# Patient Record
Sex: FEMALE
[De-identification: names, ages, dates, MRNs, and addresses within clinical notes are randomized per-mention and may not be internally consistent; named-entity substitution may affect disease eponyms.]

---

## 2018-03-02 ENCOUNTER — HOSPITAL ENCOUNTER (OUTPATIENT)
Dept: HOSPITAL 92 - CT | Age: 33
Discharge: HOME | End: 2018-03-02
Attending: FAMILY MEDICINE
Payer: COMMERCIAL

## 2018-03-02 DIAGNOSIS — N20.0: Primary | ICD-10-CM

## 2018-03-02 PROCEDURE — 74176 CT ABD & PELVIS W/O CONTRAST: CPT

## 2018-03-02 NOTE — CT
NONCONTRAST CT ABDOMEN AND PELVIS:

 

COMPARISON: 

2/16/18.

 

CLINICAL HISTORY: 

Nephrolithiasis, followup.

 

FINDINGS: 

Imaged lung bases are clear.  Redemonstration of punctate, nonobstructing left superior pole renal ca
lculus.  No right side nephrolithiasis, or evidence of obstructive uropathy.  There is retained fecal
 material throughout the colon.  There is a focal hypodensity of the left adnexa, grossly stable by n
oncontrast technique.  The solid abdominal viscera, bowel, lymph nodes, and vascular are limited in a
ssessment on the basis of noncontrast imaging.  No acute osseous pathology visualized.  There are sta
ble-appearing calcifications within the pelvis which indicate phleboliths.  

 

IMPRESSION: 

1.  Redemonstration of nonobstructing left nephrolithiasis.

 

2.  No significant interval detrimental change evident.

 

3.  Grossly stable hypodensity of the left adnexa which may be on the basis of physiologic cyst.  If 
there is need for further evaluation, followup pelvic ultrasound may be performed as necessary.

 

POS: [facility]

## 2018-04-09 ENCOUNTER — HOSPITAL ENCOUNTER (EMERGENCY)
Dept: HOSPITAL 92 - SCSER | Age: 33
Discharge: HOME | End: 2018-04-09
Payer: COMMERCIAL

## 2018-04-09 DIAGNOSIS — K52.9: Primary | ICD-10-CM

## 2018-04-09 DIAGNOSIS — Z87.442: ICD-10-CM

## 2018-04-09 DIAGNOSIS — Z87.891: ICD-10-CM

## 2018-04-09 LAB
ALBUMIN SERPL BCG-MCNC: 4.1 G/DL (ref 3.5–5)
ALP SERPL-CCNC: 85 U/L (ref 40–150)
ALT SERPL W P-5'-P-CCNC: 23 U/L (ref 8–55)
ANION GAP SERPL CALC-SCNC: 15 MMOL/L (ref 10–20)
AST SERPL-CCNC: 17 U/L (ref 5–34)
BASOPHILS # BLD AUTO: 0.1 THOU/UL (ref 0–0.2)
BASOPHILS NFR BLD AUTO: 0.8 % (ref 0–1)
BILIRUB SERPL-MCNC: 0.6 MG/DL (ref 0.2–1.2)
BUN SERPL-MCNC: 9 MG/DL (ref 7–18.7)
CALCIUM SERPL-MCNC: 8.9 MG/DL (ref 7.8–10.44)
CHLORIDE SERPL-SCNC: 106 MMOL/L (ref 98–107)
CO2 SERPL-SCNC: 23 MMOL/L (ref 22–29)
CREAT CL PREDICTED SERPL C-G-VRATE: 0 ML/MIN (ref 70–130)
EOSINOPHIL # BLD AUTO: 0 THOU/UL (ref 0–0.7)
EOSINOPHIL NFR BLD AUTO: 0.2 % (ref 0–10)
GLOBULIN SER CALC-MCNC: 3.1 G/DL (ref 2.4–3.5)
GLUCOSE SERPL-MCNC: 128 MG/DL (ref 70–105)
HGB BLD-MCNC: 14.1 G/DL (ref 12–16)
LYMPHOCYTES # BLD: 2.3 THOU/UL (ref 1.2–3.4)
LYMPHOCYTES NFR BLD AUTO: 22.4 % (ref 21–51)
MCH RBC QN AUTO: 29.6 PG (ref 27–31)
MCV RBC AUTO: 89.7 FL (ref 81–99)
MONOCYTES # BLD AUTO: 0.5 THOU/UL (ref 0.11–0.59)
MONOCYTES NFR BLD AUTO: 5.3 % (ref 0–10)
NEUTROPHILS # BLD AUTO: 7.2 THOU/UL (ref 1.4–6.5)
NEUTROPHILS NFR BLD AUTO: 71.4 % (ref 42–75)
PLATELET # BLD AUTO: 261 THOU/UL (ref 130–400)
POTASSIUM SERPL-SCNC: 3.8 MMOL/L (ref 3.5–5.1)
PREGS CONTROL BACKGROUND?: (no result)
PREGS CONTROL BAR APPEAR?: YES
RBC # BLD AUTO: 4.77 MILL/UL (ref 4.2–5.4)
SODIUM SERPL-SCNC: 140 MMOL/L (ref 136–145)
WBC # BLD AUTO: 10 THOU/UL (ref 4.8–10.8)

## 2018-04-09 PROCEDURE — 83605 ASSAY OF LACTIC ACID: CPT

## 2018-04-09 PROCEDURE — 85025 COMPLETE CBC W/AUTO DIFF WBC: CPT

## 2018-04-09 PROCEDURE — 96374 THER/PROPH/DIAG INJ IV PUSH: CPT

## 2018-04-09 PROCEDURE — 84703 CHORIONIC GONADOTROPIN ASSAY: CPT

## 2018-04-09 PROCEDURE — 83690 ASSAY OF LIPASE: CPT

## 2018-04-09 PROCEDURE — 96361 HYDRATE IV INFUSION ADD-ON: CPT

## 2018-04-09 PROCEDURE — 80053 COMPREHEN METABOLIC PANEL: CPT

## 2018-08-29 ENCOUNTER — HOSPITAL ENCOUNTER (OUTPATIENT)
Dept: HOSPITAL 92 - SDC | Age: 33
End: 2018-08-29
Attending: UROLOGY
Payer: COMMERCIAL

## 2018-08-29 VITALS — BODY MASS INDEX: 31.8 KG/M2

## 2018-08-29 DIAGNOSIS — N20.2: Primary | ICD-10-CM

## 2018-08-29 DIAGNOSIS — Z88.8: ICD-10-CM

## 2018-08-29 DIAGNOSIS — Z88.2: ICD-10-CM

## 2018-08-29 PROCEDURE — 96374 THER/PROPH/DIAG INJ IV PUSH: CPT

## 2018-08-29 PROCEDURE — 82365 CALCULUS SPECTROSCOPY: CPT

## 2018-08-29 PROCEDURE — 74018 RADEX ABDOMEN 1 VIEW: CPT

## 2018-08-29 PROCEDURE — 88300 SURGICAL PATH GROSS: CPT

## 2018-08-29 PROCEDURE — C1769 GUIDE WIRE: HCPCS

## 2018-08-29 PROCEDURE — 0TC78ZZ EXTIRPATION OF MATTER FROM LEFT URETER, VIA NATURAL OR ARTIFICIAL OPENING ENDOSCOPIC: ICD-10-PCS | Performed by: UROLOGY

## 2018-08-29 PROCEDURE — 0T778DZ DILATION OF LEFT URETER WITH INTRALUMINAL DEVICE, VIA NATURAL OR ARTIFICIAL OPENING ENDOSCOPIC: ICD-10-PCS | Performed by: UROLOGY

## 2018-08-29 PROCEDURE — C1758 CATHETER, URETERAL: HCPCS

## 2018-08-29 PROCEDURE — 74420 UROGRAPHY RTRGR +-KUB: CPT

## 2018-08-29 NOTE — RAD
RETROGRADE PYELOGRAM:

 

Indication: Single image from cystoscopy procedure is presented during IV retrograde procedure. 

 

FINDINGS/IMPRESSION: 

A single image is presented and demonstrates a double pigtail left ureteral stent in place.

 

POS: H

## 2018-08-29 NOTE — RAD
KUB:

 

History: Pre op. Renal calculus. 

 

Comparison: 8-16-18 CT examination. 

 

FINDINGS: 

The bowel gas pattern appears nonobstructive. I do not see any definitive renal calculi. The left ure
teral stent appears to be in good position. I do not see a definite ureteral calculus. There are calc
ifications in the pelvis which appear to represent phleboliths. 

 

IMPRESSION: 

Left ureteral stent in place. No definite renal or ureteral calculi. 

 

POS: [facility]

## 2019-09-01 NOTE — OP
DATE OF PROCEDURE:  08/29/2018

 

PREOPERATIVE DIAGNOSES:  A 33-year-old female with migration of left renal 
calculi to left distal ureter uretrovesical junction, presented with flank pain
, suspicious urinary tract infection component.

 

POSTOPERATIVE DIAGNOSES:  A 33-year-old female with migration of left renal 
calculi to left distal ureter uretrovesical junction, presented with flank pain
, suspicious urinary tract infection component.

 

PROCEDURE:  Cystoscopy, left 6 x 24 double-J ureteral stent exchange with 
dangler, rigid ureteroscopy, basket extraction of stone.

 

SURGEON:  Julia Quinn D.O.

 

ANESTHESIA:  General.

 

COMPLICATIONS:  None apparent.

 

SPECIMEN:  Stone for chemical analysis.

 

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 
stones.  She had a CT scan done earlier this year demonstrating left 
nonobstructing stone.  She presented to the emergency room due to distal 
migration of the stone with flank pain.  Urinalysis suspicious for UTI.  With 
repeat urine culture negative, she presents for ureteroscopy, laser lithotripsy
, stone extraction.  Risks and complications including, but not limited to, 
bleeding, pain, infection, urosepsis, injury to ureter, bladder, kidney, 
possible secondary procedure was reviewed.  All questions were answered to her 
satisfaction and she desired to proceed.

 

DESCRIPTION OF THE PROCEDURE:  After an informed consent is signed, the patient 
was taken to the operating room, placed in a dorsal lithotomy position with the 
genital area prepped and draped in the usual surgical sterile fashion.  A 21-
[place] cystoscope was utilized.  Upon entering the bladder, clear urine was 
noted.  The ureteral stent that was preexisting was removed to the level of the 
meatus and a 0.35 sensor wire was passed through the stent to the upper pole.  
At this time, a rigid ureteroscope was then passed.  Since she had an 
indwelling stent, she was passively dilated.  I was able to pass a rigid 
ureteroscope without trauma and able to visualize the stone at the level of the 
S3 ureter.  A Zero Tip nitinol basket was utilized to extract the stone 
atraumatically.  She tolerated the procedure well.  A 6 x 24 double-J ureteral 
stent was placed with dangler taped to the patient's pubic symphysis.  She is 
discharged with ciprofloxacin for 7 days, Ditropan 5 mg 1 p.o. q.8 hours p.r.n. 
bladder spasm, Norco 5/325 number 40, Colace p.r.n.  She will follow up with me 
next Thursday for stent pull on d[place].

 

ROBLES
Patent

## 2022-02-11 ENCOUNTER — HOSPITAL ENCOUNTER (OUTPATIENT)
Dept: HOSPITAL 92 - CSHLAB | Age: 37
Discharge: HOME | End: 2022-02-11
Attending: OBSTETRICS & GYNECOLOGY
Payer: COMMERCIAL

## 2022-02-11 DIAGNOSIS — Z20.822: ICD-10-CM

## 2022-02-11 DIAGNOSIS — Z01.812: Primary | ICD-10-CM

## 2022-02-11 LAB
HBSAG INDEX: 0.18 S/CO (ref 0–0.99)
HGB BLD-MCNC: 11.2 G/DL (ref 12–15.5)
MCH RBC QN AUTO: 27.7 PG (ref 27–33)
MCV RBC AUTO: 89.1 FL (ref 81.6–98.3)
PLATELET # BLD AUTO: 258 10X3/UL (ref 150–450)
RBC # BLD AUTO: 4.04 10X6/UL (ref 3.9–5.03)
SYPHILIS ANTIBODY INDEX: 0.03 S/CO
WBC # BLD AUTO: 8.8 10X3/UL (ref 3.5–10.5)

## 2022-02-11 PROCEDURE — 86780 TREPONEMA PALLIDUM: CPT

## 2022-02-11 PROCEDURE — 85027 COMPLETE CBC AUTOMATED: CPT

## 2022-02-11 PROCEDURE — 86900 BLOOD TYPING SEROLOGIC ABO: CPT

## 2022-02-11 PROCEDURE — U0003 INFECTIOUS AGENT DETECTION BY NUCLEIC ACID (DNA OR RNA); SEVERE ACUTE RESPIRATORY SYNDROME CORONAVIRUS 2 (SARS-COV-2) (CORONAVIRUS DISEASE [COVID-19]), AMPLIFIED PROBE TECHNIQUE, MAKING USE OF HIGH THROUGHPUT TECHNOLOGIES AS DESCRIBED BY CMS-2020-01-R: HCPCS

## 2022-02-11 PROCEDURE — U0005 INFEC AGEN DETEC AMPLI PROBE: HCPCS

## 2022-02-11 PROCEDURE — 86901 BLOOD TYPING SEROLOGIC RH(D): CPT

## 2022-02-11 PROCEDURE — 87340 HEPATITIS B SURFACE AG IA: CPT

## 2022-02-15 ENCOUNTER — HOSPITAL ENCOUNTER (INPATIENT)
Dept: HOSPITAL 92 - CSHLD | Age: 37
LOS: 2 days | Discharge: HOME | End: 2022-02-17
Attending: OBSTETRICS & GYNECOLOGY | Admitting: OBSTETRICS & GYNECOLOGY
Payer: COMMERCIAL

## 2022-02-15 VITALS — BODY MASS INDEX: 34.2 KG/M2

## 2022-02-15 DIAGNOSIS — Z3A.39: ICD-10-CM

## 2022-02-15 DIAGNOSIS — Z80.9: ICD-10-CM

## 2022-02-15 DIAGNOSIS — Z88.2: ICD-10-CM

## 2022-02-15 DIAGNOSIS — Z88.8: ICD-10-CM

## 2022-02-15 DIAGNOSIS — Z88.1: ICD-10-CM

## 2022-02-15 DIAGNOSIS — Z40.03: ICD-10-CM

## 2022-02-15 DIAGNOSIS — O35.8XX0: ICD-10-CM

## 2022-02-15 DIAGNOSIS — O34.211: Primary | ICD-10-CM

## 2022-02-15 LAB
HBSAG INDEX: 0.15 S/CO (ref 0–0.99)
HGB BLD-MCNC: 11.5 G/DL (ref 12–15.5)
MCH RBC QN AUTO: 27.8 PG (ref 27–33)
MCV RBC AUTO: 87.9 FL (ref 81.6–98.3)
PLATELET # BLD AUTO: 268 10X3/UL (ref 150–450)
RBC # BLD AUTO: 4.14 10X6/UL (ref 3.9–5.03)
WBC # BLD AUTO: 9.1 10X3/UL (ref 3.5–10.5)

## 2022-02-15 PROCEDURE — 36415 COLL VENOUS BLD VENIPUNCTURE: CPT

## 2022-02-15 PROCEDURE — S0028 INJECTION, FAMOTIDINE, 20 MG: HCPCS

## 2022-02-15 PROCEDURE — 88302 TISSUE EXAM BY PATHOLOGIST: CPT

## 2022-02-15 PROCEDURE — 86900 BLOOD TYPING SEROLOGIC ABO: CPT

## 2022-02-15 PROCEDURE — 86850 RBC ANTIBODY SCREEN: CPT

## 2022-02-15 PROCEDURE — 87340 HEPATITIS B SURFACE AG IA: CPT

## 2022-02-15 PROCEDURE — 0UB70ZZ EXCISION OF BILATERAL FALLOPIAN TUBES, OPEN APPROACH: ICD-10-PCS | Performed by: OBSTETRICS & GYNECOLOGY

## 2022-02-15 PROCEDURE — 51702 INSERT TEMP BLADDER CATH: CPT

## 2022-02-15 PROCEDURE — 86901 BLOOD TYPING SEROLOGIC RH(D): CPT

## 2022-02-15 PROCEDURE — 85027 COMPLETE CBC AUTOMATED: CPT

## 2022-02-16 LAB
HGB BLD-MCNC: 9.6 G/DL (ref 12–15.5)
MCH RBC QN AUTO: 27.8 PG (ref 27–33)
MCV RBC AUTO: 89.6 FL (ref 81.6–98.3)
PLATELET # BLD AUTO: 231 10X3/UL (ref 150–450)
RBC # BLD AUTO: 3.45 10X6/UL (ref 3.9–5.03)
WBC # BLD AUTO: 10.8 10X3/UL (ref 3.5–10.5)

## 2022-02-16 RX ADMIN — HYDROCODONE BITARTRATE AND ACETAMINOPHEN PRN TAB: 5; 325 TABLET ORAL at 19:31

## 2022-02-17 VITALS — TEMPERATURE: 98.4 F | SYSTOLIC BLOOD PRESSURE: 112 MMHG | DIASTOLIC BLOOD PRESSURE: 56 MMHG

## 2022-02-17 RX ADMIN — HYDROCODONE BITARTRATE AND ACETAMINOPHEN PRN TAB: 5; 325 TABLET ORAL at 09:35

## 2022-02-17 RX ADMIN — HYDROCODONE BITARTRATE AND ACETAMINOPHEN PRN TAB: 5; 325 TABLET ORAL at 19:06

## 2022-12-02 ENCOUNTER — HOSPITAL ENCOUNTER (EMERGENCY)
Dept: HOSPITAL 92 - CSHERS | Age: 37
Discharge: HOME | End: 2022-12-02
Payer: COMMERCIAL

## 2022-12-02 DIAGNOSIS — N13.2: Primary | ICD-10-CM

## 2022-12-02 DIAGNOSIS — Z87.891: ICD-10-CM

## 2022-12-02 LAB
ALBUMIN SERPL BCG-MCNC: 4.1 G/DL (ref 3.5–5)
ALP SERPL-CCNC: 70 U/L (ref 40–110)
ALT SERPL W P-5'-P-CCNC: 10 U/L (ref 8–55)
ANION GAP SERPL CALC-SCNC: 13 MMOL/L (ref 10–20)
AST SERPL-CCNC: 11 U/L (ref 5–34)
BASOPHILS # BLD AUTO: 0.1 10X3/UL (ref 0–0.2)
BASOPHILS NFR BLD AUTO: 0.5 % (ref 0–2)
BILIRUB SERPL-MCNC: 0.5 MG/DL (ref 0.2–1.2)
BUN SERPL-MCNC: 15 MG/DL (ref 7–18.7)
CALCIUM SERPL-MCNC: 8.8 MG/DL (ref 7.8–10.44)
CHLORIDE SERPL-SCNC: 108 MMOL/L (ref 98–107)
CO2 SERPL-SCNC: 22 MMOL/L (ref 22–29)
CREAT CL PREDICTED SERPL C-G-VRATE: 0 ML/MIN (ref 70–130)
EOSINOPHIL # BLD AUTO: 0.1 10X3/UL (ref 0–0.5)
EOSINOPHIL NFR BLD AUTO: 0.8 % (ref 0–6)
GLOBULIN SER CALC-MCNC: 2.9 G/DL (ref 2.4–3.5)
GLUCOSE SERPL-MCNC: 129 MG/DL (ref 70–105)
HGB BLD-MCNC: 12.3 G/DL (ref 12–15.5)
LIPASE SERPL-CCNC: 15 U/L (ref 8–78)
LYMPHOCYTES NFR BLD AUTO: 17.4 % (ref 18–47)
MCH RBC QN AUTO: 29.2 PG (ref 27–33)
MCV RBC AUTO: 88.1 FL (ref 81.6–98.3)
MONOCYTES # BLD AUTO: 0.5 10X3/UL (ref 0–1.1)
MONOCYTES NFR BLD AUTO: 4 % (ref 0–10)
NEUTROPHILS # BLD AUTO: 10 10X3/UL (ref 1.5–8.4)
NEUTROPHILS NFR BLD AUTO: 76.9 % (ref 40–75)
PLATELET # BLD AUTO: 267 10X3/UL (ref 150–450)
POTASSIUM SERPL-SCNC: 3.7 MMOL/L (ref 3.5–5.1)
PREGS CONTROL BACKGROUND?: (no result)
PREGS CONTROL BAR APPEAR?: YES
PROT UR STRIP.AUTO-MCNC: 30 MG/DL
RBC # BLD AUTO: 4.21 10X6/UL (ref 3.9–5.03)
RBC UR QL AUTO: (no result) HPF (ref 0–3)
SODIUM SERPL-SCNC: 139 MMOL/L (ref 136–145)
SP GR UR STRIP: 1.02 (ref 1–1.03)
WBC # BLD AUTO: 13 10X3/UL (ref 3.5–10.5)
WBC UR QL AUTO: (no result) HPF (ref 0–3)

## 2022-12-02 PROCEDURE — 80053 COMPREHEN METABOLIC PANEL: CPT

## 2022-12-02 PROCEDURE — 85025 COMPLETE CBC W/AUTO DIFF WBC: CPT

## 2022-12-02 PROCEDURE — 81003 URINALYSIS AUTO W/O SCOPE: CPT

## 2022-12-02 PROCEDURE — 84703 CHORIONIC GONADOTROPIN ASSAY: CPT

## 2022-12-02 PROCEDURE — 83690 ASSAY OF LIPASE: CPT

## 2022-12-02 PROCEDURE — 96360 HYDRATION IV INFUSION INIT: CPT

## 2022-12-02 PROCEDURE — 74176 CT ABD & PELVIS W/O CONTRAST: CPT

## 2022-12-02 PROCEDURE — 81015 MICROSCOPIC EXAM OF URINE: CPT
